# Patient Record
Sex: FEMALE | Race: WHITE
[De-identification: names, ages, dates, MRNs, and addresses within clinical notes are randomized per-mention and may not be internally consistent; named-entity substitution may affect disease eponyms.]

---

## 2020-04-23 ENCOUNTER — HOSPITAL ENCOUNTER (OUTPATIENT)
Dept: HOSPITAL 92 - BICULT | Age: 25
Discharge: HOME | End: 2020-04-23
Attending: INTERNAL MEDICINE
Payer: COMMERCIAL

## 2020-04-23 DIAGNOSIS — R10.13: Primary | ICD-10-CM

## 2020-04-23 PROCEDURE — 76705 ECHO EXAM OF ABDOMEN: CPT

## 2020-04-23 NOTE — ULT
GALLBLADDER ULTRASOUND:

 

HISTORY: 

Epigastric pain.

 

FINDINGS: 

Liver echogenicity is unremarkable.  The gallbladder demonstrates no evidence of gallstones, wall thi
ckening, edema, or pericholecystic fluid.  The common bile duct is 0.2 cm.  Visualized pancreas and r
ight kidney are unremarkable.

 

IMPRESSION: 

Unremarkable right upper quadrant ultrasound.  No evidence of gallstones or other acute process.

 

POS: RRE